# Patient Record
Sex: MALE | Race: OTHER | HISPANIC OR LATINO
[De-identification: names, ages, dates, MRNs, and addresses within clinical notes are randomized per-mention and may not be internally consistent; named-entity substitution may affect disease eponyms.]

---

## 2019-12-31 PROBLEM — Z00.00 ENCOUNTER FOR PREVENTIVE HEALTH EXAMINATION: Status: ACTIVE | Noted: 2019-12-31

## 2020-01-29 ENCOUNTER — APPOINTMENT (OUTPATIENT)
Dept: GASTROENTEROLOGY | Facility: CLINIC | Age: 45
End: 2020-01-29
Payer: COMMERCIAL

## 2020-01-29 VITALS
HEART RATE: 64 BPM | HEIGHT: 65 IN | WEIGHT: 160 LBS | DIASTOLIC BLOOD PRESSURE: 60 MMHG | SYSTOLIC BLOOD PRESSURE: 120 MMHG | BODY MASS INDEX: 26.66 KG/M2 | OXYGEN SATURATION: 97 %

## 2020-01-29 PROCEDURE — 99204 OFFICE O/P NEW MOD 45 MIN: CPT

## 2020-01-29 NOTE — HISTORY OF PRESENT ILLNESS
[de-identified] : RAFAEL XIONG  is being evaluated at the request of Dr. Baires for an opinion re: constipation / BRBPR. Admits to 1-2 years of intermittent hard stools with associated BRBPR. Denies nausea, vomiting, fever, chills, diarrhea, constiaption , melena, hematemesis. Admits to minimal water / fiber daily. has a bowel move,movement every 3-4 days\par

## 2020-01-29 NOTE — ASSESSMENT
[FreeTextEntry1] : 1. Constipation:  Increase dietary water / fiber. Colonoscopy to exclude anatomic pathology\par \par 2. BRBPR: Probably hemorrhoids. Colonoscopy to confirm

## 2020-01-29 NOTE — PHYSICAL EXAM
[General Appearance - Alert] : alert [General Appearance - In No Acute Distress] : in no acute distress [Sclera] : the sclera and conjunctiva were normal [Outer Ear] : the ears and nose were normal in appearance [Neck Appearance] : the appearance of the neck was normal [FreeTextEntry1] : deferred pending upcoming  colonoscopy [] : no respiratory distress [Abdomen Soft] : soft [No CVA Tenderness] : no ~M costovertebral angle tenderness [Skin Color & Pigmentation] : normal skin color and pigmentation [No Focal Deficits] : no focal deficits [Abnormal Walk] : normal gait [Oriented To Time, Place, And Person] : oriented to person, place, and time

## 2020-01-29 NOTE — CONSULT LETTER
[Dear  ___] : Dear  [unfilled], [Consult Letter:] : I had the pleasure of evaluating your patient, [unfilled]. [Consult Closing:] : Thank you very much for allowing me to participate in the care of this patient.  If you have any questions, please do not hesitate to contact me. [Please see my note below.] : Please see my note below. [FreeTextEntry3] : Devin Lee MD\par tel: [phone]\par fax: [phone]\par  [Sincerely,] : Sincerely,

## 2020-02-28 ENCOUNTER — APPOINTMENT (OUTPATIENT)
Dept: GASTROENTEROLOGY | Facility: HOSPITAL | Age: 45
End: 2020-02-28

## 2023-03-06 ENCOUNTER — APPOINTMENT (OUTPATIENT)
Dept: FAMILY MEDICINE | Facility: CLINIC | Age: 48
End: 2023-03-06

## 2024-01-17 ENCOUNTER — APPOINTMENT (OUTPATIENT)
Dept: GASTROENTEROLOGY | Facility: CLINIC | Age: 49
End: 2024-01-17
Payer: COMMERCIAL

## 2024-01-17 VITALS
DIASTOLIC BLOOD PRESSURE: 70 MMHG | HEIGHT: 65 IN | SYSTOLIC BLOOD PRESSURE: 120 MMHG | WEIGHT: 160 LBS | BODY MASS INDEX: 26.66 KG/M2

## 2024-01-17 DIAGNOSIS — K59.00 CONSTIPATION, UNSPECIFIED: ICD-10-CM

## 2024-01-17 DIAGNOSIS — Z78.9 OTHER SPECIFIED HEALTH STATUS: ICD-10-CM

## 2024-01-17 PROCEDURE — 99203 OFFICE O/P NEW LOW 30 MIN: CPT

## 2024-01-17 NOTE — HISTORY OF PRESENT ILLNESS
[FreeTextEntry1] : Presents with a c/o constipation characterized as a hard bowel movement every 2-3 days. Admits to daily fiber, but drinks minimal water. Admits to occasional BRBPR with bowel movements.  Denies nausea, vomiting, fever, chills, diarrhea, GERD - Colonoscopy2/2020: hemorrhoids  ( BRBPR)

## 2024-01-17 NOTE — PHYSICAL EXAM
[Alert] : alert [Sclera] : the sclera and conjunctiva were normal [Normal Appearance] : the appearance of the neck was normal [No Respiratory Distress] : no respiratory distress [None] : no edema [Abdomen Soft] : soft [No CVA Tenderness] : no CVA  tenderness [Abnormal Walk] : normal gait [Skin Lesions] : no skin lesions [Oriented To Time, Place, And Person] : oriented to person, place, and time

## 2024-01-17 NOTE — ASSESSMENT
[FreeTextEntry1] : 1. Constipation:  Increase fiber.  Citrucel 2 caps BID.  Increase daily water ( at least 8 glasses daily)  2. Hemorrhoids: Colorectal evaluation  Pertinent available records reviewed

## 2024-02-16 ENCOUNTER — APPOINTMENT (OUTPATIENT)
Dept: COLORECTAL SURGERY | Facility: CLINIC | Age: 49
End: 2024-02-16

## 2024-02-22 ENCOUNTER — APPOINTMENT (OUTPATIENT)
Dept: COLORECTAL SURGERY | Facility: CLINIC | Age: 49
End: 2024-02-22
Payer: COMMERCIAL

## 2024-02-22 VITALS
BODY MASS INDEX: 27.66 KG/M2 | SYSTOLIC BLOOD PRESSURE: 118 MMHG | OXYGEN SATURATION: 98 % | HEART RATE: 55 BPM | DIASTOLIC BLOOD PRESSURE: 70 MMHG | WEIGHT: 166 LBS | HEIGHT: 65 IN

## 2024-02-22 PROCEDURE — 46500 INJECTION INTO HEMORRHOID(S): CPT

## 2024-02-22 PROCEDURE — 99202 OFFICE O/P NEW SF 15 MIN: CPT | Mod: 25

## 2024-02-29 NOTE — CONSULT LETTER
[Dear  ___] : Dear  [unfilled], [Consult Letter:] : I had the pleasure of evaluating your patient, [unfilled]. [Consult Closing:] : Thank you very much for allowing me to participate in the care of this patient.  If you have any questions, please do not hesitate to contact me. [Please see my note below.] : Please see my note below. [Sincerely,] : Sincerely, [FreeTextEntry3] : Fer Bravo MD FASCRS [FreeTextEntry2] : MD [place] MD

## 2024-02-29 NOTE — HISTORY OF PRESENT ILLNESS
[FreeTextEntry1] : Ish Whitehead   is a 49-year-old gentleman who presents for evaluation and management of painless bright red blood per rectum which has been chronic He reports his hemorrhoids are intermittently but regularly inflamed with associated bright red blood per rectum on the toilet paper. The patient admits to struggles with constipation characterized as a hard bowel movement every 2 to 3 days.   He is under the care of Dr. Lee, who performed colonoscopy in 2020 with hemorrhoidal engorgement noted.

## 2024-02-29 NOTE — ASSESSMENT
[FreeTextEntry1] : 49-year-old gentleman with intermittent but chronic hemorrhoidal issues causing bright red blood per rectum with bowel movements. Ish has a normal colonoscopy from 2022.  I have invited him to return in 4 weeks for continued evaluation and appropriate management.

## 2024-02-29 NOTE — PROCEDURE
[FreeTextEntry1] : After discussion of the potential benefits, verbal and written consent is obtained from the patient.     With the patient positioned in the left lateral decubitus position, anoscope insertion exposes each individual internal hemorrhoid quadrant.   Sodium Tetradecol 1%, 2 cc total volume distributed over 3 internal hemorrhoid quadrants using a 3 cc syringe 27-gauge needle. No complication of the procedure performance.   Written information provided to the patient on both the procedure and postprocedure care recommendations.

## 2024-02-29 NOTE — PHYSICAL EXAM
[FreeTextEntry1] : Anorectal examination with visual, digital rectal exam, anoscopic exam reveals excoriation of all 3 internal hemorrhoidal quadrants, right posterior greater than right anterior greater than left lateral.  I discussed with the patient the potential benefits of office management specifically sclerosis.

## 2024-03-28 ENCOUNTER — APPOINTMENT (OUTPATIENT)
Dept: COLORECTAL SURGERY | Facility: CLINIC | Age: 49
End: 2024-03-28
Payer: COMMERCIAL

## 2024-03-28 VITALS
OXYGEN SATURATION: 98 % | DIASTOLIC BLOOD PRESSURE: 73 MMHG | RESPIRATION RATE: 18 BRPM | WEIGHT: 166 LBS | HEIGHT: 65 IN | BODY MASS INDEX: 27.66 KG/M2 | HEART RATE: 56 BPM | SYSTOLIC BLOOD PRESSURE: 125 MMHG

## 2024-03-28 DIAGNOSIS — K64.8 OTHER HEMORRHOIDS: ICD-10-CM

## 2024-03-28 DIAGNOSIS — K62.5 HEMORRHAGE OF ANUS AND RECTUM: ICD-10-CM

## 2024-03-28 DIAGNOSIS — K64.1 SECOND DEGREE HEMORRHOIDS: ICD-10-CM

## 2024-03-28 DIAGNOSIS — Z98.890 OTHER SPECIFIED POSTPROCEDURAL STATES: ICD-10-CM

## 2024-03-28 PROCEDURE — 99212 OFFICE O/P EST SF 10 MIN: CPT | Mod: 25

## 2024-03-28 PROCEDURE — 46500 INJECTION INTO HEMORRHOID(S): CPT

## 2024-04-02 PROBLEM — K62.5 BRBPR (BRIGHT RED BLOOD PER RECTUM): Status: ACTIVE | Noted: 2020-01-29

## 2024-04-02 PROBLEM — K64.1 PROLAPSED INTERNAL HEMORRHOIDS, GRADE 2: Status: ACTIVE | Noted: 2024-02-29

## 2024-04-02 PROBLEM — K64.8 INTERNAL HEMORRHOIDS: Status: ACTIVE | Noted: 2024-02-29

## 2024-04-02 NOTE — PHYSICAL EXAM
[FreeTextEntry1] : Anorectal exam with visual ELVIS and anoscopy shows decrease in size of IH with still small area of nonprolapsing engorgement. RP iH largest of three with small amount of excoriation noted.

## 2024-04-02 NOTE — ASSESSMENT
[FreeTextEntry1] : 49 y.o. doing well following sclerosis, with resolution of IH symptoms including bleeding, pruritis ani issues.  Evan is invited to return in future if he has recurrent symptoms. If symptoms quick recurrent, consider banding of RP IH quadrant. Discussed with pt.

## 2024-04-02 NOTE — HISTORY OF PRESENT ILLNESS
[FreeTextEntry1] : Evan returns following sclerosis of internal hemorrhoids performed at his first visit. He reports complete resolution of BRBPR with BM. No sense of prolapse. He is happy with results of first threatment.

## 2024-04-10 PROBLEM — Z98.890 H/O COLONOSCOPY: Status: RESOLVED | Noted: 2024-02-22 | Resolved: 2024-04-10

## 2025-05-21 ENCOUNTER — RESULT REVIEW (OUTPATIENT)
Age: 50
End: 2025-05-21

## 2025-05-21 ENCOUNTER — APPOINTMENT (OUTPATIENT)
Dept: HEMATOLOGY ONCOLOGY | Facility: CLINIC | Age: 50
End: 2025-05-21
Payer: COMMERCIAL

## 2025-05-21 VITALS
HEART RATE: 58 BPM | SYSTOLIC BLOOD PRESSURE: 116 MMHG | BODY MASS INDEX: 28.52 KG/M2 | OXYGEN SATURATION: 95 % | HEIGHT: 65 IN | RESPIRATION RATE: 16 BRPM | DIASTOLIC BLOOD PRESSURE: 73 MMHG | WEIGHT: 171.19 LBS | TEMPERATURE: 97.3 F

## 2025-05-21 DIAGNOSIS — R16.1 SPLENOMEGALY, NOT ELSEWHERE CLASSIFIED: ICD-10-CM

## 2025-05-21 PROCEDURE — 99205 OFFICE O/P NEW HI 60 MIN: CPT

## 2025-05-29 LAB — JAK2 EXONS 12-15 MUTATION ANALYSIS: NORMAL

## 2025-06-20 ENCOUNTER — RESULT REVIEW (OUTPATIENT)
Age: 50
End: 2025-06-20

## 2025-06-20 ENCOUNTER — APPOINTMENT (OUTPATIENT)
Dept: HEMATOLOGY ONCOLOGY | Facility: CLINIC | Age: 50
End: 2025-06-20
Payer: COMMERCIAL

## 2025-06-20 VITALS
OXYGEN SATURATION: 97 % | DIASTOLIC BLOOD PRESSURE: 71 MMHG | WEIGHT: 169.38 LBS | SYSTOLIC BLOOD PRESSURE: 116 MMHG | TEMPERATURE: 97.5 F | HEART RATE: 59 BPM | HEIGHT: 65 IN | BODY MASS INDEX: 28.22 KG/M2 | RESPIRATION RATE: 16 BRPM

## 2025-06-20 PROCEDURE — 99214 OFFICE O/P EST MOD 30 MIN: CPT

## 2025-08-15 ENCOUNTER — APPOINTMENT (OUTPATIENT)
Dept: HEMATOLOGY ONCOLOGY | Facility: CLINIC | Age: 50
End: 2025-08-15

## 2025-08-15 ENCOUNTER — RESULT REVIEW (OUTPATIENT)
Age: 50
End: 2025-08-15

## 2025-08-15 VITALS
HEIGHT: 65 IN | BODY MASS INDEX: 28.22 KG/M2 | DIASTOLIC BLOOD PRESSURE: 73 MMHG | WEIGHT: 169.38 LBS | SYSTOLIC BLOOD PRESSURE: 112 MMHG | HEART RATE: 52 BPM | RESPIRATION RATE: 16 BRPM | OXYGEN SATURATION: 95 % | TEMPERATURE: 97.4 F

## 2025-08-25 ENCOUNTER — NON-APPOINTMENT (OUTPATIENT)
Age: 50
End: 2025-08-25

## 2025-08-25 ENCOUNTER — APPOINTMENT (OUTPATIENT)
Dept: HEMATOLOGY ONCOLOGY | Facility: CLINIC | Age: 50
End: 2025-08-25
Payer: COMMERCIAL

## 2025-08-25 DIAGNOSIS — R16.1 SPLENOMEGALY, NOT ELSEWHERE CLASSIFIED: ICD-10-CM

## 2025-08-25 DIAGNOSIS — D47.2 MONOCLONAL GAMMOPATHY: ICD-10-CM

## 2025-08-25 PROCEDURE — G2211 COMPLEX E/M VISIT ADD ON: CPT | Mod: 95

## 2025-08-25 PROCEDURE — 99214 OFFICE O/P EST MOD 30 MIN: CPT | Mod: 95

## 2025-09-02 PROBLEM — D47.2: Status: ACTIVE | Noted: 2025-09-02
